# Patient Record
Sex: MALE | Race: WHITE | NOT HISPANIC OR LATINO | URBAN - METROPOLITAN AREA
[De-identification: names, ages, dates, MRNs, and addresses within clinical notes are randomized per-mention and may not be internally consistent; named-entity substitution may affect disease eponyms.]

---

## 2020-02-03 ENCOUNTER — EMERGENCY (EMERGENCY)
Facility: HOSPITAL | Age: 20
LOS: 0 days | Discharge: HOME | End: 2020-02-03
Attending: EMERGENCY MEDICINE | Admitting: EMERGENCY MEDICINE
Payer: COMMERCIAL

## 2020-02-03 VITALS
DIASTOLIC BLOOD PRESSURE: 60 MMHG | HEART RATE: 65 BPM | TEMPERATURE: 96 F | WEIGHT: 214.95 LBS | HEIGHT: 73 IN | OXYGEN SATURATION: 100 % | SYSTOLIC BLOOD PRESSURE: 112 MMHG | RESPIRATION RATE: 17 BRPM

## 2020-02-03 DIAGNOSIS — R11.0 NAUSEA: ICD-10-CM

## 2020-02-03 DIAGNOSIS — R19.7 DIARRHEA, UNSPECIFIED: ICD-10-CM

## 2020-02-03 DIAGNOSIS — R10.13 EPIGASTRIC PAIN: ICD-10-CM

## 2020-02-03 LAB
ALBUMIN SERPL ELPH-MCNC: 4.8 G/DL — SIGNIFICANT CHANGE UP (ref 3.5–5.2)
ALP SERPL-CCNC: 70 U/L — SIGNIFICANT CHANGE UP (ref 30–115)
ALT FLD-CCNC: 17 U/L — SIGNIFICANT CHANGE UP (ref 13–38)
ANION GAP SERPL CALC-SCNC: 12 MMOL/L — SIGNIFICANT CHANGE UP (ref 7–14)
AST SERPL-CCNC: 20 U/L — SIGNIFICANT CHANGE UP (ref 13–38)
BASOPHILS # BLD AUTO: 0.03 K/UL — SIGNIFICANT CHANGE UP (ref 0–0.2)
BASOPHILS NFR BLD AUTO: 0.5 % — SIGNIFICANT CHANGE UP (ref 0–1)
BILIRUB DIRECT SERPL-MCNC: <0.2 MG/DL — SIGNIFICANT CHANGE UP (ref 0–0.2)
BILIRUB INDIRECT FLD-MCNC: >0.2 MG/DL — SIGNIFICANT CHANGE UP (ref 0.2–1.2)
BILIRUB SERPL-MCNC: 0.4 MG/DL — SIGNIFICANT CHANGE UP (ref 0.2–1.2)
BUN SERPL-MCNC: 16 MG/DL — SIGNIFICANT CHANGE UP (ref 10–20)
CALCIUM SERPL-MCNC: 8.8 MG/DL — SIGNIFICANT CHANGE UP (ref 8.5–10.1)
CHLORIDE SERPL-SCNC: 102 MMOL/L — SIGNIFICANT CHANGE UP (ref 98–110)
CO2 SERPL-SCNC: 27 MMOL/L — SIGNIFICANT CHANGE UP (ref 17–32)
CREAT SERPL-MCNC: 0.8 MG/DL — SIGNIFICANT CHANGE UP (ref 0.7–1.5)
EOSINOPHIL # BLD AUTO: 0.09 K/UL — SIGNIFICANT CHANGE UP (ref 0–0.7)
EOSINOPHIL NFR BLD AUTO: 1.6 % — SIGNIFICANT CHANGE UP (ref 0–8)
GLUCOSE SERPL-MCNC: 85 MG/DL — SIGNIFICANT CHANGE UP (ref 70–99)
HCT VFR BLD CALC: 45.5 % — SIGNIFICANT CHANGE UP (ref 42–52)
HGB BLD-MCNC: 15.2 G/DL — SIGNIFICANT CHANGE UP (ref 14–18)
IMM GRANULOCYTES NFR BLD AUTO: 0.5 % — HIGH (ref 0.1–0.3)
LIDOCAIN IGE QN: 20 U/L — SIGNIFICANT CHANGE UP (ref 7–60)
LYMPHOCYTES # BLD AUTO: 1.76 K/UL — SIGNIFICANT CHANGE UP (ref 1.2–3.4)
LYMPHOCYTES # BLD AUTO: 31.2 % — SIGNIFICANT CHANGE UP (ref 20.5–51.1)
MCHC RBC-ENTMCNC: 29.3 PG — SIGNIFICANT CHANGE UP (ref 27–31)
MCHC RBC-ENTMCNC: 33.4 G/DL — SIGNIFICANT CHANGE UP (ref 32–37)
MCV RBC AUTO: 87.7 FL — SIGNIFICANT CHANGE UP (ref 80–94)
MONOCYTES # BLD AUTO: 0.64 K/UL — HIGH (ref 0.1–0.6)
MONOCYTES NFR BLD AUTO: 11.3 % — HIGH (ref 1.7–9.3)
NEUTROPHILS # BLD AUTO: 3.09 K/UL — SIGNIFICANT CHANGE UP (ref 1.4–6.5)
NEUTROPHILS NFR BLD AUTO: 54.9 % — SIGNIFICANT CHANGE UP (ref 42.2–75.2)
NRBC # BLD: 0 /100 WBCS — SIGNIFICANT CHANGE UP (ref 0–0)
PLATELET # BLD AUTO: 232 K/UL — SIGNIFICANT CHANGE UP (ref 130–400)
POTASSIUM SERPL-MCNC: 4 MMOL/L — SIGNIFICANT CHANGE UP (ref 3.5–5)
POTASSIUM SERPL-SCNC: 4 MMOL/L — SIGNIFICANT CHANGE UP (ref 3.5–5)
PROT SERPL-MCNC: 7.4 G/DL — SIGNIFICANT CHANGE UP (ref 6–8)
RBC # BLD: 5.19 M/UL — SIGNIFICANT CHANGE UP (ref 4.7–6.1)
RBC # FLD: 12.7 % — SIGNIFICANT CHANGE UP (ref 11.5–14.5)
SODIUM SERPL-SCNC: 141 MMOL/L — SIGNIFICANT CHANGE UP (ref 135–146)
WBC # BLD: 5.64 K/UL — SIGNIFICANT CHANGE UP (ref 4.8–10.8)
WBC # FLD AUTO: 5.64 K/UL — SIGNIFICANT CHANGE UP (ref 4.8–10.8)

## 2020-02-03 PROCEDURE — 99284 EMERGENCY DEPT VISIT MOD MDM: CPT

## 2020-02-03 RX ORDER — SODIUM CHLORIDE 9 MG/ML
2000 INJECTION INTRAMUSCULAR; INTRAVENOUS; SUBCUTANEOUS ONCE
Refills: 0 | Status: COMPLETED | OUTPATIENT
Start: 2020-02-03 | End: 2020-02-03

## 2020-02-03 RX ORDER — ONDANSETRON 8 MG/1
4 TABLET, FILM COATED ORAL ONCE
Refills: 0 | Status: COMPLETED | OUTPATIENT
Start: 2020-02-03 | End: 2020-02-03

## 2020-02-03 RX ORDER — FAMOTIDINE 10 MG/ML
20 INJECTION INTRAVENOUS ONCE
Refills: 0 | Status: COMPLETED | OUTPATIENT
Start: 2020-02-03 | End: 2020-02-03

## 2020-02-03 RX ORDER — DIPHENHYDRAMINE HYDROCHLORIDE AND LIDOCAINE HYDROCHLORIDE AND ALUMINUM HYDROXIDE AND MAGNESIUM HYDRO
30 KIT ONCE
Refills: 0 | Status: COMPLETED | OUTPATIENT
Start: 2020-02-03 | End: 2020-02-03

## 2020-02-03 RX ORDER — OMEPRAZOLE 10 MG/1
1 CAPSULE, DELAYED RELEASE ORAL
Qty: 14 | Refills: 0
Start: 2020-02-03 | End: 2020-02-16

## 2020-02-03 RX ADMIN — DIPHENHYDRAMINE HYDROCHLORIDE AND LIDOCAINE HYDROCHLORIDE AND ALUMINUM HYDROXIDE AND MAGNESIUM HYDRO 30 MILLILITER(S): KIT at 22:44

## 2020-02-03 RX ADMIN — ONDANSETRON 4 MILLIGRAM(S): 8 TABLET, FILM COATED ORAL at 21:18

## 2020-02-03 RX ADMIN — FAMOTIDINE 20 MILLIGRAM(S): 10 INJECTION INTRAVENOUS at 21:18

## 2020-02-03 RX ADMIN — SODIUM CHLORIDE 2000 MILLILITER(S): 9 INJECTION INTRAMUSCULAR; INTRAVENOUS; SUBCUTANEOUS at 21:16

## 2020-02-03 NOTE — ED PROVIDER NOTE - NSFOLLOWUPINSTRUCTIONS_ED_ALL_ED_FT
Make appointment with  PMD   this week,  and  GI   RETURN TO ED  FOR ANY NEW WORSENING OR CONCERNING SYMPTOMS TO YOU, ALSO AS WE DISCUSSED.        Peptic Ulcer Eating Plan  Peptic ulcers are sores that form on the lining of the stomach, esophagus, or the part of the small intestine that is attached to the stomach (duodenum). These sores are also called stomach ulcers. When ulcers develop, they can cause a burning feeling in the stomach as well as bloating, nausea, vomiting, and poor appetite.    If you have a history of peptic ulcers, it is important to keep track of what foods and drinks cause symptoms.    What are tips for following this plan?  Eat a healthy, well-balanced diet.  This includes:    Fresh fruits and vegetables. Eat a variety of colors of fruits and vegetables.  Whole grains. Try to make sure at least half of the grains you eat each day are whole grains.  Low-fat dairy.   Lean meat, fish, poultry, eggs, beans, and nuts.  Healthy fats, such as olive oil, grapeseed oil, or canola oil. Try to eat less than 8 teaspoons of fats and oils each day.    Avoid foods that cause irritation or pain. These may be different for different people. Keep a food diary to identify foods that cause symptoms.  Avoid processed foods that have added salt and sugar.  Avoid drinking alcohol.      Recommended foods  Grains     Whole grains.  Vegetables     All fresh or frozen vegetables. Low-sodium canned vegetables.  Fruits     All fresh, frozen, or dried fruit. Fruit canned in juice.  Meats and other protein foods     Lean cuts of meat. Skinless poultry. Fresh or canned fish. Eggs. Tofu. Nuts and nut butter. Dried beans. Low-sodium canned beans.  Dairy     Low-fat or nonfat (skim) milk. Nonfat or low-fat yogurt. Nonfat or low-fat cheese.  Beverages     Water. Soy or nut milks. Caffeine-free soft drinks. Herbal tea.  Fats and oils     Olive oil. Canola oil. Grapeseed oil. Sunflower oil.  Seasoning and other foods     Low-fat salad dressing. Ketchup. Low-fat mayonnaise. All spices except pepper. Low-sodium seasoning mixes.  Foods to avoid  Meats and other protein foods     Fatty meats. Fried meats. Any meat that causes symptoms.  Dairy     Whole milk. Ice cream. Cream. Chocolate milk.  Beverages     Alcohol. Coffee. Cola and energy drinks. Black or green tea. Cocoa.  Fats and oils     Butter. Lard. Ghee.  Seasoning and other foods     Pepper. Hot sauce. Any seasonings or condiments that cause symptoms.  Summary  Peptic ulcers can cause burning in the stomach as well as bloating, nausea, vomiting, and poor appetite. You may be able to limit symptoms by avoiding foods that make you feel worse.  Work with your dietitian or health care provider to identify foods that cause symptoms. This may include caffeinated drinks, alcohol, or pepper.  This information is not intended to replace advice given to you by your health care provider. Make sure you discuss any questions you have with your health care provider        diet:  WHAT YOU NEED TO KNOW:    A diet for stomach ulcers and gastritis is a meal plan that limits foods that irritate your stomach. Certain foods may worsen symptoms such as stomach pain, bloating, heartburn, or indigestion.     DISCHARGE INSTRUCTIONS:    Foods to limit or avoid: You may need to avoid acidic, spicy, or high-fat foods. Not all foods affect everyone the same way. You will need to learn which foods worsen your symptoms and limit those foods. The following are some foods that may worsen ulcer or gastritis symptoms:     Beverages:   Whole milk and chocolate milk      Hot cocoa and cola      Any beverage with caffeine      Regular and decaffeinated coffee      Peppermint and spearmint tea      Green and black tea, with or without caffeine      Orange and grapefruit juices      Drinks that contain alcohol      Spices and seasonings:   Black and red pepper      Chili powder      Mustard seed and nutmeg      Other foods:   Dairy foods made from whole milk or cream      Chocolate      Spicy or strongly flavored cheeses, such as jalapeno or black pepper      Highly seasoned, high-fat meats, such as sausage, salami, coronado, ham, and cold cuts      Hot chiles and peppers      Tomato products, such as tomato paste, tomato sauce, or tomato juice    Foods to include: Eat a variety of healthy foods from all the food groups. Eat fruits, vegetables, whole grains, and fat-free or low-fat dairy foods. Whole grains include whole-wheat breads, cereals, pasta, and brown rice. Choose lean meats, poultry (chicken and turkey), fish, beans, eggs, and nuts. A healthy meal plan is low in unhealthy fats, salt, and added sugar. Healthy fats include olive oil and canola oil. Ask your dietitian for more information about a healthy diet.    Other helpful guidelines:     Do not eat right before bedtime. Stop eating at least 2 hours before bedtime.    Eat small, frequent meals. Your stomach may tolerate small, frequent meals better than large meals.          Abdominal Pain, Adult  ImageMany things can cause belly (abdominal) pain. Most times, belly pain is not dangerous. Many cases of belly pain can be watched and treated at home. Sometimes belly pain is serious, though. Your doctor will try to find the cause of your belly pain.    Follow these instructions at home:  Take over-the-counter and prescription medicines only as told by your doctor. Do not take medicines that help you poop (laxatives) unless told to by your doctor.  Drink enough fluid to keep your pee (urine) clear or pale yellow.  Watch your belly pain for any changes.  Keep all follow-up visits as told by your doctor. This is important.  Contact a doctor if:  Your belly pain changes or gets worse.  You are not hungry, or you lose weight without trying.  You are having trouble pooping (constipated) or have watery poop (diarrhea) for more than 2–3 days.  You have pain when you pee or poop.  Your belly pain wakes you up at night.  Your pain gets worse with meals, after eating, or with certain foods.  You are throwing up and cannot keep anything down.  You have a fever.  Get help right away if:  Your pain does not go away as soon as your doctor says it should.  You cannot stop throwing up.  Your pain is only in areas of your belly, such as the right side or the left lower part of the belly.  You have bloody or black poop, or poop that looks like tar.  You have very bad pain, cramping, or bloating in your belly.  You have signs of not having enough fluid or water in your body (dehydration), such as:    Dark pee, very little pee, or no pee.  Cracked lips.  Dry mouth.  Sunken eyes.  Sleepiness.  Weakness.    This information is not intended to replace advice given to you by your health care provider. Make sure you discuss any questions you have with your health care provider.    Document Released: 06/05/2009 Document Revised: 07/07/2017 Document Reviewed: 05/31/2017  [employer] Patient Education © 2018 [employer]

## 2020-02-03 NOTE — ED ADULT NURSE NOTE - NSIMPLEMENTINTERV_GEN_ALL_ED
Implemented All Universal Safety Interventions:  [place] to call system. Call bell, personal items and telephone within reach. Instruct patient to call for assistance. Room bathroom lighting operational. Non-slip footwear when patient is off stretcher. Physically safe environment: no spills, clutter or unnecessary equipment. Stretcher in lowest position, wheels locked, appropriate side rails in place.

## 2020-02-03 NOTE — ED PROVIDER NOTE - CARE PROVIDERS DIRECT ADDRESSES
[email or web address]

## 2020-02-03 NOTE — ED PROVIDER NOTE - PATIENT PORTAL LINK FT
You can access the FollowMyHealth Patient Portal offered by [facility] by registering at the following website: [email or web address]. By joining [email or web address]’s FollowMyHealth portal, you will also be able to view your health information using other applications (apps) compatible with our system.

## 2020-02-03 NOTE — ED PROVIDER NOTE - CLINICAL SUMMARY MEDICAL DECISION MAKING FREE TEXT BOX
Pt no pmhx  pw 2 days of epigastric  sharp  pain constant worse with eating a/w nausea and diarrhea no fever chills, nonradiating, no recent  nsaid or etoh.  hx of egd as a child  wnl.  no change in stool color ,  Alert nontoxic, perrl eomi, no pallor  no jaundice  [facility] RRR, Resp CTA  Abd soft mild tenderness epigastrium LUQ nondistended , No cva tenderness, No le edema, no skin lesions.   pt feels better  after synmptomatic teatment  labs wnl,    Patient to be discharged from ED. Any available test results were discussed with and printed  for patient.  Verbal instructions given, including instructions to return to ED immediately for any new, worsening, or concerning symptoms. Limitations of ED work up discussed.  Patient reports understanding of above with capacity and insight. Written discharge instructions additionally given, including follow-up plan with GI,   pmd

## 2020-02-03 NOTE — ED PROVIDER NOTE - OBJECTIVE STATEMENT
19 year old male comes to emergency room for sharp pains in his stomach with nausea  and diarrhea. patient denies fever/chills.

## 2020-02-03 NOTE — ED PROVIDER NOTE - CARE PROVIDER_API CALL
Wendy Artis (MD)  Internal Medicine  [address] [place] [address]  Phone: [phone]  Fax: [phone]  Follow Up Time:     Constantine Lemus ()  Gastroenterology  [address] [place] [id number]  Phone: [phone]  Fax: [phone]  Follow Up Time:

## 2020-11-14 ENCOUNTER — TRANSCRIPTION ENCOUNTER (OUTPATIENT)
Age: 20
End: 2020-11-14

## 2022-01-07 ENCOUNTER — TRANSCRIPTION ENCOUNTER (OUTPATIENT)
Age: 22
End: 2022-01-07

## 2023-01-26 ENCOUNTER — APPOINTMENT (OUTPATIENT)
Dept: RADIOLOGY | Facility: CLINIC | Age: 23
End: 2023-01-26

## 2023-01-26 ENCOUNTER — APPOINTMENT (OUTPATIENT)
Dept: PAIN MANAGEMENT | Facility: CLINIC | Age: 23
End: 2023-01-26
Payer: COMMERCIAL

## 2023-01-26 DIAGNOSIS — S29.012A STRAIN OF MUSCLE AND TENDON OF BACK WALL OF THORAX, INITIAL ENCOUNTER: ICD-10-CM

## 2023-01-26 DIAGNOSIS — S39.012A STRAIN OF MUSCLE, FASCIA AND TENDON OF LOWER BACK, INITIAL ENCOUNTER: ICD-10-CM

## 2023-01-26 PROBLEM — Z00.00 ENCOUNTER FOR PREVENTIVE HEALTH EXAMINATION: Status: ACTIVE | Noted: 2023-01-26

## 2023-01-26 PROBLEM — Z78.9 OTHER SPECIFIED HEALTH STATUS: Chronic | Status: ACTIVE | Noted: 2020-02-03

## 2023-01-26 PROCEDURE — 72070 X-RAY EXAM THORAC SPINE 2VWS: CPT

## 2023-01-26 PROCEDURE — 99204 OFFICE O/P NEW MOD 45 MIN: CPT

## 2023-01-26 PROCEDURE — 72110 X-RAY EXAM L-2 SPINE 4/>VWS: CPT

## 2023-01-30 NOTE — HISTORY OF PRESENT ILLNESS
[FreeTextEntry1] : Mr. Ghosh is a 22 year old male presenting as a walk-in to our facility regarding his back pain. Patient reports two month history of lumbar and thoracic pain following an injuring while playing basketball. Patient rates his lumbar pain 6/10 on pain scale and pain in the thoracic spine 8/10 on the pain scale. Patient denies numbness or tingling.

## 2023-01-30 NOTE — ASSESSMENT
[FreeTextEntry1] : Mr. Ghosh is a 22 year old male presenting as a walk-in to our facility regarding his back pain.  We will obtain an x-ray of both the lumbar and thoracic spine, patient will follow up in two months for further reassessment.\par \par Will order a thoracic ,2 view, x-ray due to pain and decrease in range of motion in that area to delineate a pain generator.\par \par Will order a lumbar spine, 4 view, x-ray due to pain and decrease in range of motion in that area to delineate a pain generator.\par \par  Entered by Lucinda Vora, acting as scribe for Dr. Royal.\par  \par The documentation recorded by the scribe, in my presence, accurately reflects the service I personally performed, and the decisions made by me with my edits as appropriate.\par  \par Best Regards, \par Raymond Royal MD \par Board Certified, Anesthesiology \par Board Certified, Pain Medicine\par

## 2023-02-09 ENCOUNTER — APPOINTMENT (OUTPATIENT)
Dept: PAIN MANAGEMENT | Facility: CLINIC | Age: 23
End: 2023-02-09

## 2024-01-26 ENCOUNTER — NON-APPOINTMENT (OUTPATIENT)
Age: 24
End: 2024-01-26

## 2024-02-27 ENCOUNTER — APPOINTMENT (OUTPATIENT)
Dept: VASCULAR SURGERY | Facility: CLINIC | Age: 24
End: 2024-02-27
Payer: COMMERCIAL

## 2024-02-27 VITALS
HEIGHT: 78 IN | SYSTOLIC BLOOD PRESSURE: 129 MMHG | DIASTOLIC BLOOD PRESSURE: 77 MMHG | BODY MASS INDEX: 25.96 KG/M2 | HEART RATE: 65 BPM | WEIGHT: 224.38 LBS

## 2024-02-27 PROCEDURE — 93970 EXTREMITY STUDY: CPT

## 2024-02-27 PROCEDURE — 99204 OFFICE O/P NEW MOD 45 MIN: CPT

## 2024-02-27 NOTE — HISTORY OF PRESENT ILLNESS
[FreeTextEntry1] : The patient is a 23-year-old male who works at [employer] presents complaining of symptomatic varicose veins bilaterally.  He states his legs feel heavy and crampy at the end of the day when he is done working.  The patient also states he does not like the look of these veins.

## 2024-02-27 NOTE — PHYSICAL EXAM
[2+] : left 2+ [Ankle Swelling (On Exam)] : present [Ankle Swelling Bilaterally] : bilaterally  [Varicose Veins Of Lower Extremities] : bilaterally [] : bilaterally

## 2024-02-27 NOTE — ASSESSMENT
[FreeTextEntry1] : The patient is a 23-year-old male with right leg extensive varicose veins.  The patient complains of throbbing and heaviness to his lower extremities.  The patient will benefit from an endovenous laser ablation.  He works at [employer] and stands for long periods of time.  The patient is wearing compression stocking therapy at 20 to 30 mmHg compression knee-high.  Despite compression therapy still complains of symptoms.  I would like to schedule him for a right lower extremity endovenous laser ablation of his incompetent saphenous vein.  I, Dr. Nick Shearer, personally performed the evaluation and management (E/M) services for this new patient. That E/M includes conducting the clinically appropriate initial history &/or exam, assessing all conditions, and establishing the plan of care. Today, my RORY, Gisselle Topete PA-C, was here to observe my evaluation and management service for this patient & follow plan of care established by me going forward.

## 2024-02-27 NOTE — VITALS
[Throbbing] : throbbing [de-identified] : Right leg and calf throbbing [FreeTextEntry1] : Rest [FreeTextEntry2] : Standing for long periods of time

## 2024-02-27 NOTE — DATA REVIEWED
[FreeTextEntry1] : Venous duplex right there is no evidence of acute deep venous thrombosis or superficial thrombophlebitis all major veins are patent and compressible with normal spectral Doppler waveform analysis.  Greater saphenous vein is incompetent with a diameter of 6.0 mm and refill time greater than 6 seconds.  There is a straight segment from the groin to the knee.  The area is a large incompetent branch that comes off with a diameter of 7.0 mm off the greater saphenous vein at the calf.    Left no evidence of acute deep venous thrombosis superficial thrombophlebitis all major veins are patent and compressible deep system negative for reflux

## 2024-03-29 ENCOUNTER — APPOINTMENT (OUTPATIENT)
Dept: VASCULAR SURGERY | Facility: CLINIC | Age: 24
End: 2024-03-29
Payer: COMMERCIAL

## 2024-03-29 PROCEDURE — 36478 ENDOVENOUS LASER 1ST VEIN: CPT | Mod: RT

## 2024-03-29 NOTE — PROCEDURE
[FreeTextEntry3] : Procedural Report Name: YONG PATEL   MRN: [id number]  : Mar 21 2000  Requesting MD: BALAJI RAMOS  Exam:  EVLT - Laser DOS: 2024  History: 24 year year old M with insufficiency of right greater saphenous vein referred for endovenous laser ablation of the saphenous vein.  Anesthesia:  local only  Procedure time: 1hour  Procedure and Findings:  Informed consent was obtained from the patient prior to this procedure.  Continuous physiological monitoring of the patients vital signs was performed throughout the procedure.  The patient was brought into the procedure suite.  The patient was then placed supine on the procedure table and the leg prepped and draped in the usual sterile fashion.  The saphenous vein was accessed in the thigh under ultrasound guidance using a 21 gauge micropuncture needle.  The needle was exchanged over a 0.018 inch guide wire for a 4 Monegasque tapered dilator.  After exchanging for a 0.035 inch guide wire, a five Monegasque 45 cm long sheath was advanced to the saphenofemoral junction.  An [employer] laser fiber was then advanced through the sheath to the saphenofemoral junction.  Tumescent anesthesia using a .25% lidocaine solution was administered along the entire course of the vein under ultrasound guidance.  Compression of the vein was noted throughout its course.  After re-determining that the tip of the fiber was below the saphenofemoral junction, the laser was activated to 8 Monroy energy and slowly withdrawn wit the sheath.  Total pullback time was approximately 241  seconds.  Total energy delivered was 1932 J.  Total vein length treated was 42 cm.  The laser was deactivated approximately 2 cm proximal to the puncture site.  The sheath and fiber was then removed.  Repeat ultrasound exam demonstrate no flow within the saphenous vein.  The common femoral vein remains patent.  A grade 2 thigh high compression stocking was then applied.  The patient tolerated the procedure without incident.  Impression:  Successful endovenous laser ablation of right / left greater saphenous vein as described above.

## 2024-04-11 ENCOUNTER — APPOINTMENT (OUTPATIENT)
Dept: VASCULAR SURGERY | Facility: CLINIC | Age: 24
End: 2024-04-11
Payer: COMMERCIAL

## 2024-04-11 DIAGNOSIS — I83.893 VARICOSE VEINS OF BILATERAL LOWER EXTREMITIES WITH OTHER COMPLICATIONS: ICD-10-CM

## 2024-04-11 PROCEDURE — 99212 OFFICE O/P EST SF 10 MIN: CPT

## 2024-04-11 PROCEDURE — 93971 EXTREMITY STUDY: CPT

## 2024-04-11 NOTE — DATA REVIEWED
[FreeTextEntry1] : I performed a venous duplex which was medically necessary to evaluate for GSV closure. It showed no evidence of DVT and R GSV is closed.

## 2024-04-11 NOTE — ASSESSMENT
[FreeTextEntry1] : 23 y/o M with symptomatic RLE varicose veins and GSV reflux on duplex, underwent EVLT of R GSV 2 weeks ago, presents for follow up.  Duplex showed no evidence of DVT and R GSV is closed.  Advised use of compression stockings as tolerated.   I, Dr. Nick Shearer, personally performed the evaluation and management (E/M) services for this established patient who presents today with (a) new problem(s)/exacerbation of (an) existing condition(s). That E/M includes conducting the clinically appropriate interval history &/or exam, assessing all new/exacerbated conditions, and establishing a new plan of care. Today, my RORY, Carolyn Garcia PA-C, was here to observe my evaluation and management service for this new problem/exacerbated condition and follow the plan of care established by me going forward.